# Patient Record
Sex: FEMALE | Race: WHITE
[De-identification: names, ages, dates, MRNs, and addresses within clinical notes are randomized per-mention and may not be internally consistent; named-entity substitution may affect disease eponyms.]

---

## 2019-11-03 ENCOUNTER — HOSPITAL ENCOUNTER (EMERGENCY)
Dept: HOSPITAL 57 - BURERS | Age: 84
Discharge: HOME | End: 2019-11-03
Payer: MEDICARE

## 2019-11-03 DIAGNOSIS — S22.32XA: Primary | ICD-10-CM

## 2019-11-03 DIAGNOSIS — Z79.82: ICD-10-CM

## 2019-11-03 DIAGNOSIS — K21.9: ICD-10-CM

## 2019-11-03 DIAGNOSIS — I10: ICD-10-CM

## 2019-11-03 DIAGNOSIS — M19.90: ICD-10-CM

## 2019-11-03 DIAGNOSIS — Z79.899: ICD-10-CM

## 2019-11-03 DIAGNOSIS — Z86.73: ICD-10-CM
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
## 2019-11-03 PROCEDURE — 93005 ELECTROCARDIOGRAM TRACING: CPT

## 2019-11-03 PROCEDURE — 94799 UNLISTED PULMONARY SVC/PX: CPT

## 2019-11-03 NOTE — RAD
LEFT RIBS WITH PA CHEST:

 

11/03/2019

 

FINDINGS:

The bones are osteoporotic which could easily mask subtle fractures in this patient.

 

There is a definite acute fracture of the left 5th rib posterolaterally. There is a second fracture a
 little more distal to it that may or may not be acute. There are some equivocal findings in the 6th,
 7th and 8th ribs that could be subtle fractures but these are not certain.

 

The lungs are clear. There is no pneumothorax or pleural effusion. Heart size is normal. Mediastinum 
shows no widening or shift. Trachea is midline.

 

IMPRESSION:

Definite acute fracture of left 5th rib. Possible subtle fractures of 6th, 7th and 8th ribs.

 

CODE T

 

POS: HOME

## 2019-11-09 ENCOUNTER — HOSPITAL ENCOUNTER (EMERGENCY)
Dept: HOSPITAL 57 - BURERS | Age: 84
Discharge: HOME | End: 2019-11-09
Payer: MEDICARE

## 2019-11-09 DIAGNOSIS — Z79.82: ICD-10-CM
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
## 2019-11-09 DIAGNOSIS — Z86.73: ICD-10-CM

## 2019-11-09 DIAGNOSIS — Z79.1: ICD-10-CM

## 2019-11-09 DIAGNOSIS — Z79.899: ICD-10-CM

## 2019-11-09 DIAGNOSIS — I10: ICD-10-CM

## 2019-11-09 DIAGNOSIS — S01.81XD: ICD-10-CM

## 2019-11-09 DIAGNOSIS — K59.00: Primary | ICD-10-CM

## 2019-11-09 DIAGNOSIS — M19.90: ICD-10-CM

## 2019-11-09 PROCEDURE — 99283 EMERGENCY DEPT VISIT LOW MDM: CPT

## 2019-12-16 ENCOUNTER — HOSPITAL ENCOUNTER (INPATIENT)
Dept: HOSPITAL 57 - BURERS | Age: 84
LOS: 3 days | Discharge: NURSING, REHAB OR LONG-TERM CARE | DRG: 641 | End: 2019-12-19
Attending: FAMILY MEDICINE | Admitting: FAMILY MEDICINE
Payer: MEDICARE

## 2019-12-16 DIAGNOSIS — Z90.710: ICD-10-CM

## 2019-12-16 DIAGNOSIS — I10: ICD-10-CM

## 2019-12-16 DIAGNOSIS — Z79.899: ICD-10-CM

## 2019-12-16 DIAGNOSIS — R11.2: ICD-10-CM
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
## 2019-12-16 DIAGNOSIS — Z96.642: ICD-10-CM

## 2019-12-16 DIAGNOSIS — S22.41XA: ICD-10-CM

## 2019-12-16 DIAGNOSIS — B96.20: ICD-10-CM

## 2019-12-16 DIAGNOSIS — N39.0: ICD-10-CM

## 2019-12-16 DIAGNOSIS — E86.0: Primary | ICD-10-CM

## 2019-12-16 DIAGNOSIS — R53.1: ICD-10-CM

## 2019-12-16 DIAGNOSIS — M19.91: ICD-10-CM

## 2019-12-16 DIAGNOSIS — Z79.82: ICD-10-CM

## 2019-12-16 DIAGNOSIS — K21.9: ICD-10-CM

## 2019-12-16 DIAGNOSIS — K59.00: ICD-10-CM

## 2019-12-16 DIAGNOSIS — E78.5: ICD-10-CM

## 2019-12-16 DIAGNOSIS — R53.81: ICD-10-CM

## 2019-12-16 LAB
ANION GAP SERPL CALC-SCNC: 15 MMOL/L (ref 10–20)
BACTERIA UR QL AUTO: (no result) HPF
BASOPHILS # BLD AUTO: 0.1 THOU/UL (ref 0–0.2)
BASOPHILS NFR BLD AUTO: 0.7 % (ref 0–1)
BUN SERPL-MCNC: 23 MG/DL (ref 9.8–20.1)
CALCIUM SERPL-MCNC: 9.5 MG/DL (ref 7.8–10.44)
CHLORIDE SERPL-SCNC: 106 MMOL/L (ref 98–107)
CO2 SERPL-SCNC: 26 MMOL/L (ref 23–31)
CREAT CL PREDICTED SERPL C-G-VRATE: 0 ML/MIN (ref 70–130)
EOSINOPHIL # BLD AUTO: 0.1 THOU/UL (ref 0–0.7)
EOSINOPHIL NFR BLD AUTO: 1.5 % (ref 0–10)
GLUCOSE SERPL-MCNC: 135 MG/DL (ref 83–110)
HGB BLD-MCNC: 13.1 G/DL (ref 12–16)
LYMPHOCYTES # BLD AUTO: 0.8 THOU/UL (ref 1.2–3.4)
LYMPHOCYTES NFR BLD AUTO: 10.2 % (ref 21–51)
MCH RBC QN AUTO: 28.4 PG (ref 27–31)
MCV RBC AUTO: 91 FL (ref 78–98)
MONOCYTES # BLD AUTO: 0.5 THOU/UL (ref 0.11–0.59)
MONOCYTES NFR BLD AUTO: 6.2 % (ref 0–10)
NEUTROPHILS # BLD AUTO: 6.3 THOU/UL (ref 1.4–6.5)
NEUTROPHILS NFR BLD AUTO: 81.4 % (ref 42–75)
PLATELET # BLD AUTO: 237 THOU/UL (ref 130–400)
POTASSIUM SERPL-SCNC: 3.7 MMOL/L (ref 3.5–5.1)
RBC # BLD AUTO: 4.61 MILL/UL (ref 4.2–5.4)
SODIUM SERPL-SCNC: 143 MMOL/L (ref 136–145)
WBC # BLD AUTO: 7.8 THOU/UL (ref 4.8–10.8)
WBC UR QL AUTO: (no result) HPF (ref 0–3)

## 2019-12-16 PROCEDURE — 87077 CULTURE AEROBIC IDENTIFY: CPT

## 2019-12-16 PROCEDURE — 85018 HEMOGLOBIN: CPT

## 2019-12-16 PROCEDURE — G0008 ADMIN INFLUENZA VIRUS VAC: HCPCS

## 2019-12-16 PROCEDURE — 85049 AUTOMATED PLATELET COUNT: CPT

## 2019-12-16 PROCEDURE — 82565 ASSAY OF CREATININE: CPT

## 2019-12-16 PROCEDURE — 85025 COMPLETE CBC W/AUTO DIFF WBC: CPT

## 2019-12-16 PROCEDURE — 87186 SC STD MICRODIL/AGAR DIL: CPT

## 2019-12-16 PROCEDURE — 90662 IIV NO PRSV INCREASED AG IM: CPT

## 2019-12-16 PROCEDURE — 87086 URINE CULTURE/COLONY COUNT: CPT

## 2019-12-16 PROCEDURE — 36415 COLL VENOUS BLD VENIPUNCTURE: CPT

## 2019-12-16 PROCEDURE — 93005 ELECTROCARDIOGRAM TRACING: CPT

## 2019-12-16 PROCEDURE — 90471 IMMUNIZATION ADMIN: CPT

## 2019-12-16 PROCEDURE — 80048 BASIC METABOLIC PNL TOTAL CA: CPT

## 2019-12-16 PROCEDURE — 80053 COMPREHEN METABOLIC PANEL: CPT

## 2019-12-16 PROCEDURE — 85014 HEMATOCRIT: CPT

## 2019-12-16 PROCEDURE — 81001 URINALYSIS AUTO W/SCOPE: CPT

## 2019-12-16 NOTE — RAD
XR Ribs Rt>= 2 View W/PA CXR



HISTORY: Fall with right rib pain, chest pain



FINDINGS:

There are fractures along the right fifth, sixth, seventh, eighth and ninth ribs.



Reported By: Peter Loo 

Electronically Signed:  12/16/2019 2:48 PM

## 2019-12-17 LAB
ALBUMIN SERPL BCG-MCNC: 3.2 G/DL (ref 3.4–4.8)
ALP SERPL-CCNC: 95 U/L (ref 40–110)
ALT SERPL W P-5'-P-CCNC: 23 U/L (ref 8–55)
ANION GAP SERPL CALC-SCNC: 13 MMOL/L (ref 10–20)
AST SERPL-CCNC: 25 U/L (ref 5–34)
BASOPHILS # BLD AUTO: 0.1 THOU/UL (ref 0–0.2)
BASOPHILS NFR BLD AUTO: 0.9 % (ref 0–1)
BILIRUB SERPL-MCNC: 0.6 MG/DL (ref 0.2–1.2)
BUN SERPL-MCNC: 16 MG/DL (ref 9.8–20.1)
CALCIUM SERPL-MCNC: 8.5 MG/DL (ref 7.8–10.44)
CHLORIDE SERPL-SCNC: 107 MMOL/L (ref 98–107)
CO2 SERPL-SCNC: 25 MMOL/L (ref 23–31)
CREAT CL PREDICTED SERPL C-G-VRATE: 51 ML/MIN (ref 70–130)
EOSINOPHIL # BLD AUTO: 0.1 THOU/UL (ref 0–0.7)
EOSINOPHIL NFR BLD AUTO: 1.1 % (ref 0–10)
GLOBULIN SER CALC-MCNC: 2.7 G/DL (ref 2.4–3.5)
GLUCOSE SERPL-MCNC: 101 MG/DL (ref 83–110)
HGB BLD-MCNC: 11.2 G/DL (ref 12–16)
LYMPHOCYTES # BLD AUTO: 0.8 THOU/UL (ref 1.2–3.4)
LYMPHOCYTES NFR BLD AUTO: 13.4 % (ref 21–51)
MCH RBC QN AUTO: 28.2 PG (ref 27–31)
MCV RBC AUTO: 90.4 FL (ref 78–98)
MONOCYTES # BLD AUTO: 0.6 THOU/UL (ref 0.11–0.59)
MONOCYTES NFR BLD AUTO: 10.1 % (ref 0–10)
NEUTROPHILS # BLD AUTO: 4.4 THOU/UL (ref 1.4–6.5)
NEUTROPHILS NFR BLD AUTO: 74.6 % (ref 42–75)
PLATELET # BLD AUTO: 200 THOU/UL (ref 130–400)
POTASSIUM SERPL-SCNC: 3.7 MMOL/L (ref 3.5–5.1)
RBC # BLD AUTO: 3.99 MILL/UL (ref 4.2–5.4)
SODIUM SERPL-SCNC: 141 MMOL/L (ref 136–145)
WBC # BLD AUTO: 5.9 THOU/UL (ref 4.8–10.8)

## 2019-12-17 RX ADMIN — ASPIRIN 81 MG CHEWABLE TABLET SCH MG: 81 TABLET CHEWABLE at 10:20

## 2019-12-17 RX ADMIN — SENNOSIDES PRN TAB: 8.6 TABLET, FILM COATED ORAL at 21:04

## 2019-12-17 NOTE — HP
CHIEF COMPLAINT:  Rib pain status post fall with dehydration and generalized

weakness. 



HISTORY OF PRESENT ILLNESS:  This is an 84-year-old female, who lives at home 
alone

and ambulates with the assistance of a walker, who fell at her home setting 
earlier

today prompting an evaluation at the [facility] Emergency Department.  
Subsequent

workup revealed the patient to have multiple right-sided rib fractures 
including the

5th, 6th, 7th, 8th and 9th ribs.  Her lab work is consistent with dehydration 
as her

BUN is 23 with a creatinine of 0.83.  She did receive some pain medication in 
the

emergency department, and upon re-evaluation, developed nausea and vomiting.  
The

patient denies having nausea or vomiting at her home setting.  The patient 
notably

has physical deconditioning and complains of feeling generalized weakness.

Secondary to the patient's deconditioned state, inability to care for herself,

multiple rib fractures with need for further pain management and dehydrated 
state,

she has been admitted for further care. 



PAST MEDICAL HISTORY:  Gastroesophageal reflux disease, hypertension, 
dyslipidemia,

and arthritis. 



PAST SURGICAL HISTORY:  She had an abdominal hysterectomy in .  She had a 
left

hip replacement in . 



FAMILY HISTORY:  Noncontributory.



SOCIAL HISTORY:  Nonsmoker with no ETOH or illicit drug use.



ALLERGIES:  TO LOPERAMIDE AND REPORTEDLY TRAMADOL.



CURRENT MEDICATIONS:  

1. Calcium 500 mg once a day.

2. Ibuprofen 400 mg q.12 hours.

3. Aspirin 81 mg daily.

4. Tylenol 500 mg q.6 hours.

5. Bisoprolol/hydrochlorothiazide 5/6.25 mg daily.

6. Amlodipine 5 mg daily.

7. Lovastatin 20 mg at bedtime.



REVIEW OF SYSTEMS:  GENERAL:  The patient complains of fatigue.  She denies 
fever. 

EARS, NOSE, AND THROAT:  Denies sore throat, nasal drainage or congestion. 

CARDIOVASCULAR:  Denies chest pain or palpitations. 

RESPIRATORY:  Denies shortness of breath or cough. 

GASTROINTESTINAL:  Denies abdominal pain.  She complains of nausea and vomiting.

Denies diarrhea or constipation. 

GENITOURINARY:  Denies dysuria. 

MUSCULOSKELETAL:  Complains of right-sided rib pain. 

DERMATOLOGIC:  Denies rash. 

NEUROLOGIC:  Denies headache.



LABORATORY DATA:  White blood cell count is 7.8, H and H are 13.1 and 41.9, and

platelets are 237.  Sodium is 143, potassium is 3.7, BUN is 23, creatinine is 
0.83,

GFR is 65, and glucose is 135. 



IMAGIN2019, rib x-ray shows fractures along the right 5th, 6th, 7th, 
8th,

and 9th ribs. 



PHYSICAL EXAMINATION:

VITAL SIGNS:  Blood pressure 183/85, oxygen 91% on room air, pulse 72, 
respiratory

rate 18, and temperature 97.7. 

GENERAL:  The patient is alert and oriented, in no acute distress. 

HEAD, EYES, EARS, NOSE, AND THROAT:  Conjunctivae are clear.  Extraocular 
muscles

are intact.  Dry mucous membranes.  She is edentulous.  Normocephalic and

atraumatic. 

NECK:  Supple.  No lymphadenopathy. 

CARDIOVASCULAR:  Regular rate and rhythm.  Normal S1 and S2. 

RESPIRATORY:  Clear to auscultation bilaterally without wheezes, rales or 
rhonchi. 

GASTROINTESTINAL:  Soft and nontender to palpation. 

EXTREMITIES:  She has a leg length discrepancy with the right leg shorter than 
the

left.  She has also arthritic changes to bilateral hands.  There is a 
peripheral IV

to the left upper extremity. 

SKIN:  No rash. 

NEUROLOGIC:  Nonfocal.  She has generalized weakness.



ASSESSMENT AND PLAN:  

1. Multiple right-sided rib fractures status post fall.  We will provide pain

medication with an attempt to avoid opioid secondary to the patient's 
intolerance. 

2. Dehydration.  We will continue the patient on normal saline at 75 mL an hour

overnight.  Recheck her labs in the morning. 

3. Nausea and vomiting.  The patient will be provided Zofran sublingual p.r.n.

4. Physical deconditioning.  Order Physical Therapy and Occupational Therapy.

5. Hypertension.  We will resume the patient's home blood pressure medications.

6. Dyslipidemia.  We will resume the patient's home statin medication.

7. Prophylaxis.  We will provide famotidine and Lovenox.



CODE STATUS:  Full.



DISPOSITION:  Question the patient's ability to satisfactorily care for herself 
at

her home setting.  We will monitor her progress and consider transition to a 
nursing

home. 







Job ID:  [id number]



[facility]

## 2019-12-18 RX ADMIN — SENNOSIDES PRN TAB: 8.6 TABLET, FILM COATED ORAL at 08:55

## 2019-12-18 RX ADMIN — ASPIRIN 81 MG CHEWABLE TABLET SCH MG: 81 TABLET CHEWABLE at 09:00

## 2019-12-19 ENCOUNTER — HOSPITAL ENCOUNTER (INPATIENT)
Dept: HOSPITAL 57 - BURMED | Age: 84
LOS: 12 days | Discharge: NURSING, REHAB OR LONG-TERM CARE | DRG: 561 | End: 2019-12-31
Attending: FAMILY MEDICINE | Admitting: FAMILY MEDICINE
Payer: MEDICARE

## 2019-12-19 VITALS — DIASTOLIC BLOOD PRESSURE: 61 MMHG | SYSTOLIC BLOOD PRESSURE: 118 MMHG | TEMPERATURE: 98.3 F

## 2019-12-19 VITALS — BODY MASS INDEX: 24.9 KG/M2

## 2019-12-19 DIAGNOSIS — I10: ICD-10-CM

## 2019-12-19 DIAGNOSIS — K21.9: ICD-10-CM

## 2019-12-19 DIAGNOSIS — Z79.82: ICD-10-CM

## 2019-12-19 DIAGNOSIS — S22.41XD: Primary | ICD-10-CM

## 2019-12-19 DIAGNOSIS — E78.5: ICD-10-CM
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
## 2019-12-19 DIAGNOSIS — Z79.899: ICD-10-CM

## 2019-12-19 DIAGNOSIS — M19.91: ICD-10-CM

## 2019-12-19 LAB
CREAT CL PREDICTED SERPL C-G-VRATE: 56 ML/MIN (ref 70–130)
HGB BLD-MCNC: 11.9 G/DL (ref 12–16)
PLATELET # BLD AUTO: 198 THOU/UL (ref 130–400)

## 2019-12-19 RX ADMIN — ASPIRIN 81 MG CHEWABLE TABLET SCH MG: 81 TABLET CHEWABLE at 08:59

## 2019-12-20 RX ADMIN — MAGNESIUM HYDROXIDE PRN ML: 400 SUSPENSION ORAL at 08:30

## 2019-12-20 RX ADMIN — ASPIRIN 81 MG CHEWABLE TABLET SCH MG: 81 TABLET CHEWABLE at 08:34

## 2019-12-21 RX ADMIN — ASPIRIN 81 MG CHEWABLE TABLET SCH MG: 81 TABLET CHEWABLE at 08:48

## 2019-12-21 NOTE — HP
DATE OF TRANSFER:  12/19/2019.



REASON FOR TRANSFER:  Debilitation and weakness status post rib fracture.



HISTORY OF PRESENT ILLNESS:  The patient is an 84-year-old white female, who lives

alone, usually ambulates with the assistance of a walker, who reports falling after

walking down the ramp of her house.  She was seen in the emergency room, workup

showed multiple right-sided rib fractures at 6th, 5th, 7th, 8th, and 9th.  She was

also found to be dehydrated with an elevated BUN and requiring significant pain

medications with IV pain medication requirement.  The patient was admitted to the

hospital, given IV pain medication, dehydration resolved with IV and oral intake,

and Physical Therapy and Occupational Therapy were consulted.  The patient was found

to be stable enough to be discharged from acute care status, but continued to need

skilled nursing with pain management as well as physical therapy and occupational

therapy.  It was completely unsafe for her to be discharged to home, and thus was

transferred to swing bed status on 12/19/2019. 



PAST MEDICAL HISTORY:  

1. Osteoarthritis.

2. Gastroesophageal reflux disease.

3. Hypertension.

4. Hyperlipidemia.



SOCIAL HISTORY:  The patient has no significant alcohol or social drug use.  She

lives alone for many years and has no children. 



MEDICATIONS:  Include:

1. Calcium 500 mg p.o. daily.

2. Ibuprofen 400 mg q.12 hours p.r.n. pain.

3. Aspirin 81 mg daily.

4. Bisoprolol/hydrochlorothiazide 5/6.25 p.o. daily.

5. Amlodipine 5 mg daily.

6. Lovastatin 20 mg p.o. at bedtime.

7. The patient has been on morphine as well as hydrocodone p.r.n. pain.



REVIEW OF SYSTEMS:  The patient does report continued pain in her right side with

deep inspiration or movement secondary to rib fractures.  No significant cough.  No

URI-like symptoms.  No dysuria or hematuria.  No change in urinary frequency.  No

shortness of breath other than secondary to pain.  The patient reports appetite is

fairly good.  The patient denies depression.  No significant back pain.  No recent

weight loss, no weight gain.  No rashes reported. 



PHYSICAL EXAMINATION:

GENERAL:  Elderly white female, alert and oriented x3, in no obvious distress. 

VITAL SIGNS:  Blood pressure is 138/68, respiratory rate is 16, pulse is 72. 

HEENT:  Atraumatic, normocephalic.  Extraocular movements are intact.  Pupils are

equal, round, and reactive to light and accommodation. 

NECK:  Supple.  No masses palpated. 

CHEST:  Clear to auscultation bilaterally.  Evaluation to the chest wall showed

tenderness in the right anterior and lateral rib areas. 

HEART:  __________ rate and rhythm. 

ABDOMEN:  Soft, nontender, nondistended.  No masses are palpated. 

EXTREMITIES:  Show no cyanosis, clubbing, or edema.



ASSESSMENT AND PLAN:  

1. Status post multiple rib fractures, the patient is debilitated, lives alone, will

need continued inpatient occupational and physical therapy under skilled nursing. 

2. Pain, we will continue pain control as needed.

3. Hypertension, presently controlled.

4. Osteoarthritis, stable.

5. Hyperlipidemia, continue lovastatin.

6. Gastroesophageal reflux disease, presently controlled.



DISPOSITION:  The patient will likely need another 10 to 14 days of skilled nursing.

 The plan is to discharge to home with home health when she is able to transfer and

has enough strength for her activities of daily living. 







Job ID:  [id number] normal...

## 2019-12-22 RX ADMIN — MAGNESIUM HYDROXIDE PRN ML: 400 SUSPENSION ORAL at 08:55

## 2019-12-22 RX ADMIN — ASPIRIN 81 MG CHEWABLE TABLET SCH MG: 81 TABLET CHEWABLE at 08:51

## 2019-12-23 RX ADMIN — ASPIRIN 81 MG CHEWABLE TABLET SCH MG: 81 TABLET CHEWABLE at 08:27

## 2019-12-24 RX ADMIN — ASPIRIN 81 MG CHEWABLE TABLET SCH MG: 81 TABLET CHEWABLE at 08:56

## 2019-12-25 RX ADMIN — ASPIRIN 81 MG CHEWABLE TABLET SCH MG: 81 TABLET CHEWABLE at 08:48

## 2019-12-26 RX ADMIN — ASPIRIN 81 MG CHEWABLE TABLET SCH MG: 81 TABLET CHEWABLE at 07:46

## 2019-12-27 RX ADMIN — ASPIRIN 81 MG CHEWABLE TABLET SCH MG: 81 TABLET CHEWABLE at 08:42

## 2019-12-28 RX ADMIN — ASPIRIN 81 MG CHEWABLE TABLET SCH MG: 81 TABLET CHEWABLE at 08:31

## 2019-12-29 RX ADMIN — ASPIRIN 81 MG CHEWABLE TABLET SCH MG: 81 TABLET CHEWABLE at 08:16

## 2019-12-30 RX ADMIN — ASPIRIN 81 MG CHEWABLE TABLET SCH MG: 81 TABLET CHEWABLE at 09:18

## 2019-12-31 VITALS — SYSTOLIC BLOOD PRESSURE: 163 MMHG | TEMPERATURE: 97.9 F | DIASTOLIC BLOOD PRESSURE: 74 MMHG

## 2019-12-31 RX ADMIN — ASPIRIN 81 MG CHEWABLE TABLET SCH MG: 81 TABLET CHEWABLE at 08:44

## 2020-01-02 NOTE — DIS
DATE OF ADMISSION:  12/19/2019



DATE OF DISCHARGE:  12/31/2019



ADMISSION DIAGNOSES:  Multiple right rib fractures, status post fall, 
dehydration,

nausea, vomiting, physical deconditioning. 



SECONDARY DIAGNOSES:  Hypertension, and dyslipidemia.



PROCEDURES:  12/16/2019, Ribs with chest x-ray showed there are fractures along 
the

right 5th, 6th, 7th, 8th, and 9th ribs. 



HOSPITAL COURSE:  An 84-year-old female presented to the [facility] Emergency

Department, status post fall at home.  The patient typically ambulates with the

assistance of a walker.  Workup revealed the patient to have multiple rib 
fractures

to the right side causing her intractable pain and a decreased functional 
status.

Further workup revealed the patient to be dehydrated as well that she was 
admitted

for IV hydration, pain control, and need for skilled care with physical therapy 
and

occupational therapy.  She is unable to care for herself at home where she lives

alone.  The patient was able to improve the functional status in regard to 
mobility

and transfers with the aid of physical therapy and occupational therapy.  She 
was

provided medications for pain control. Her dehydration quickly resolved early in

her stay with the assistance of IV fluids.  As stated, the patient typically 
lives

alone and secondary to having multiple falls in the past with a prior admission

secondary to pelvic fracture, she has been advised to transition to a nursing

facility; she is amenable to do this and has been set up to discharge to 
[facility] for further care. 



DISPOSITION:  The patient will be discharged to [facility].



DISCHARGE MEDICATIONS:  Include;

1. Senokot at bedtime p.r.n.

2. MiraLAX 17 g daily p.r.n.

3. Zofran 4 mg sublingual q.6 hours p.r.n.

4. Lovastatin 20 mg at bedtime.

5. Ibuprofen 400 mg b.i.d. p.r.n.

6. Famotidine 20 mg b.i.d.

7. Zyrtec 10 mg daily. 

8. Bisoprolol/hydrochlorothiazide 5/6.25 mg daily.

9. Amlodipine 10 mg daily.

10. Tylenol p.r.n.

11. Calcium carbonate 500 mg daily.

12. Aspirin 81 mg daily.







Job ID:  [id number]



[facility]

## 2020-02-02 ENCOUNTER — HOSPITAL ENCOUNTER (EMERGENCY)
Dept: HOSPITAL 57 - BURERS | Age: 85
Discharge: HOME | End: 2020-02-02
Payer: MEDICARE

## 2020-02-02 DIAGNOSIS — Z79.899: ICD-10-CM

## 2020-02-02 DIAGNOSIS — Z79.82: ICD-10-CM

## 2020-02-02 DIAGNOSIS — F41.9: ICD-10-CM

## 2020-02-02 DIAGNOSIS — K21.9: ICD-10-CM

## 2020-02-02 DIAGNOSIS — M19.90: ICD-10-CM

## 2020-02-02 DIAGNOSIS — I10: ICD-10-CM

## 2020-02-02 DIAGNOSIS — Z86.73: ICD-10-CM

## 2020-02-02 DIAGNOSIS — J90: Primary | ICD-10-CM

## 2020-02-02 LAB
ALBUMIN SERPL BCG-MCNC: 3.9 G/DL (ref 3.4–4.8)
ALP SERPL-CCNC: 180 U/L (ref 40–110)
ALT SERPL W P-5'-P-CCNC: 16 U/L (ref 8–55)
ANION GAP SERPL CALC-SCNC: 15 MMOL/L (ref 10–20)
AST SERPL-CCNC: 16 U/L (ref 5–34)
BASOPHILS # BLD AUTO: 0.1 THOU/UL (ref 0–0.2)
BASOPHILS NFR BLD AUTO: 1.5 % (ref 0–1)
BILIRUB SERPL-MCNC: (no result) MG/DL (ref 0.2–1.2)
BUN SERPL-MCNC: 14 MG/DL (ref 9.8–20.1)
CALCIUM SERPL-MCNC: 8.9 MG/DL (ref 7.8–10.44)
CHLORIDE SERPL-SCNC: 105 MMOL/L (ref 98–107)
CO2 SERPL-SCNC: 25 MMOL/L (ref 23–31)
CREAT CL PREDICTED SERPL C-G-VRATE: 0 ML/MIN (ref 70–130)
EOSINOPHIL # BLD AUTO: 0.2 THOU/UL (ref 0–0.7)
EOSINOPHIL NFR BLD AUTO: 3.5 % (ref 0–10)
GLOBULIN SER CALC-MCNC: 3.2 G/DL (ref 2.4–3.5)
GLUCOSE SERPL-MCNC: 126 MG/DL (ref 83–110)
HGB BLD-MCNC: 12.7 G/DL (ref 12–16)
LYMPHOCYTES # BLD AUTO: 0.6 THOU/UL (ref 1.2–3.4)
LYMPHOCYTES NFR BLD AUTO: 14 % (ref 21–51)
MCH RBC QN AUTO: 27.8 PG (ref 27–31)
MCV RBC AUTO: 87.8 FL (ref 78–98)
MONOCYTES # BLD AUTO: 0.5 THOU/UL (ref 0.11–0.59)
MONOCYTES NFR BLD AUTO: 10.5 % (ref 0–10)
NEUTROPHILS # BLD AUTO: 3.2 THOU/UL (ref 1.4–6.5)
NEUTROPHILS NFR BLD AUTO: 70.4 % (ref 42–75)
PLATELET # BLD AUTO: 238 THOU/UL (ref 130–400)
POTASSIUM SERPL-SCNC: 3.3 MMOL/L (ref 3.5–5.1)
RBC # BLD AUTO: 4.56 MILL/UL (ref 4.2–5.4)
SODIUM SERPL-SCNC: 142 MMOL/L (ref 136–145)
WBC # BLD AUTO: 4.6 THOU/UL (ref 4.8–10.8)

## 2020-02-02 PROCEDURE — 85025 COMPLETE CBC W/AUTO DIFF WBC: CPT

## 2020-02-02 PROCEDURE — 84484 ASSAY OF TROPONIN QUANT: CPT

## 2020-02-02 PROCEDURE — 80053 COMPREHEN METABOLIC PANEL: CPT

## 2020-02-02 PROCEDURE — 71045 X-RAY EXAM CHEST 1 VIEW: CPT

## 2020-02-02 PROCEDURE — 83880 ASSAY OF NATRIURETIC PEPTIDE: CPT

## 2020-02-02 PROCEDURE — 93005 ELECTROCARDIOGRAM TRACING: CPT

## 2020-02-02 PROCEDURE — 96374 THER/PROPH/DIAG INJ IV PUSH: CPT

## 2020-02-04 ENCOUNTER — HOSPITAL ENCOUNTER (OUTPATIENT)
Dept: HOSPITAL 92 - RAD | Age: 85
Discharge: HOME | End: 2020-02-04
Attending: INTERNAL MEDICINE
Payer: MEDICARE

## 2020-02-04 ENCOUNTER — HOSPITAL ENCOUNTER (OUTPATIENT)
Dept: HOSPITAL 92 - SDC | Age: 85
Discharge: HOME | End: 2020-02-04
Attending: INTERNAL MEDICINE
Payer: MEDICARE

## 2020-02-04 DIAGNOSIS — R91.8: ICD-10-CM

## 2020-02-04 DIAGNOSIS — J90: Primary | ICD-10-CM

## 2020-02-04 DIAGNOSIS — K21.9: ICD-10-CM

## 2020-02-04 DIAGNOSIS — E11.9: ICD-10-CM

## 2020-02-04 DIAGNOSIS — E78.00: ICD-10-CM

## 2020-02-04 DIAGNOSIS — I77.819: ICD-10-CM

## 2020-02-04 DIAGNOSIS — R06.00: Primary | ICD-10-CM

## 2020-02-04 DIAGNOSIS — Z79.899: ICD-10-CM

## 2020-02-04 DIAGNOSIS — I10: ICD-10-CM

## 2020-02-04 DIAGNOSIS — Z79.82: ICD-10-CM

## 2020-02-04 DIAGNOSIS — I70.0: ICD-10-CM

## 2020-02-04 DIAGNOSIS — J90: ICD-10-CM

## 2020-02-04 LAB
AMYLASE PLR-CCNC: 41 U/L
GLUCOSE PLR-MCNC: 107 MG/DL
LDH PLR L TO P-CCNC: 225 U/L
NONHEMATIC CELLS NFR FLD MANUAL: 63 %
PROT PLR-MCNC: 5 G/DL
RBC # FLD AUTO: (no result) /CUMM
TRIGL FLD-MCNC: 51 MG/DL
WBC # FLD AUTO: 2311 UL

## 2020-02-04 PROCEDURE — 87116 MYCOBACTERIA CULTURE: CPT

## 2020-02-04 PROCEDURE — 71045 X-RAY EXAM CHEST 1 VIEW: CPT

## 2020-02-04 PROCEDURE — 87206 SMEAR FLUORESCENT/ACID STAI: CPT

## 2020-02-04 PROCEDURE — 88112 CYTOPATH CELL ENHANCE TECH: CPT

## 2020-02-04 PROCEDURE — 82150 ASSAY OF AMYLASE: CPT

## 2020-02-04 PROCEDURE — 87205 SMEAR GRAM STAIN: CPT

## 2020-02-04 PROCEDURE — 71046 X-RAY EXAM CHEST 2 VIEWS: CPT

## 2020-02-04 PROCEDURE — 84157 ASSAY OF PROTEIN OTHER: CPT

## 2020-02-04 PROCEDURE — 89051 BODY FLUID CELL COUNT: CPT

## 2020-02-04 PROCEDURE — 82945 GLUCOSE OTHER FLUID: CPT

## 2020-02-04 PROCEDURE — 32554 ASPIRATE PLEURA W/O IMAGING: CPT

## 2020-02-04 PROCEDURE — 83615 LACTATE (LD) (LDH) ENZYME: CPT

## 2020-02-04 PROCEDURE — 87070 CULTURE OTHR SPECIMN AEROBIC: CPT

## 2020-02-04 PROCEDURE — 88305 TISSUE EXAM BY PATHOLOGIST: CPT

## 2020-02-04 PROCEDURE — 0BJQ3ZZ INSPECTION OF PLEURA, PERCUTANEOUS APPROACH: ICD-10-PCS | Performed by: INTERNAL MEDICINE

## 2020-02-04 PROCEDURE — 84478 ASSAY OF TRIGLYCERIDES: CPT

## 2020-02-04 PROCEDURE — 83986 ASSAY PH BODY FLUID NOS: CPT

## 2020-02-04 PROCEDURE — 85060 BLOOD SMEAR INTERPRETATION: CPT

## 2020-02-04 NOTE — RAD
CHEST 2 VIEWS:

 

HISTORY: 

Cough.

 

COMPARISON: 

2/2/2020.

 

FINDINGS: 

Small right pleural effusion.  Heart size is within normal limits.  Increased linear and interstitial
 markings bilaterally appear stable.  

 

IMPRESSION: 

1.  Small right pleural effusion showing little change from prior study.  Mild stable increased lauren
ngs bilaterally.

 

2.  Atherosclerosis of the aorta with ectasia.  No evidence for confluent pneumonia.

 

POS: [facility]

## 2020-02-04 NOTE — HP
HISTORY OF PRESENT ILLNESS:  The patient is an 85-year-old  female from

[facility], who was sent here for a cough for about 7 months'

duration.  She is a nonsmoker, not drinker, prior history of pneumonia, but no TB or

asthma.  She has sustained a fall in December, was hospitalized with multiple rib

fractures.  Now, she had an x-ray taken over the weekend in the ER, which showed

some right pleural effusion.  Cough is nonproductive.  No fever.  No chills. 



Rib fractures were 5th, 6th, 7th, 8th, 9th after a fall in December 2019.



HOME MEDICATIONS:  

1. MiraLAX 17.

2. Zofran.

3. Lovastatin 20.

4. Ibuprofen 400.

5. Pepcid 20.

6. Zyrtec 10.

7. Ziac 5/6.25 once a day.

8. Amlodipine 10.

9. Tylenol.

10. Calcium.

11. Aspirin.



PAST MEDICAL HISTORY:  Pertinent mainly for hypertension, high cholesterol.  No

diabetes. 



PAST SURGICAL HISTORY:  None recently.



ALLERGIES:  APPARENTLY NONE.



SOCIAL AND FAMILY HISTORY:  She has never been .  Lives alone.  Worked on the

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 



REVIEW OF SYSTEMS:  Otherwise 10-point negative.



PHYSICAL EXAMINATION:

VITAL SIGNS:  Sats were 94% on room air, pulse 80, blood pressure  __________ 

CHEST:  Decreased breath sounds, right lung. Left lung unremarkable. 

CARDIAC: Normal S1, S2.  No gallops. 

ABDOMEN:  Soft without masses. 



X-ray shows right pleural effusion.



IMPRESSION:  

1. Right pleural effusion, chronic cough, probably secondary to multiple rib

fractures. 

2. Mild hypertension.

3. Reflux.

4. Nonsmoker.

5. Advanced age.

6. Diagnostic therapeutic thoracentesis to be performed. Further recommendation as

above. 







Job ID:  [id number]

## 2020-02-04 NOTE — DIS
DATE OF ADMISSION:  02/04/2020



DATE OF DISCHARGE:  02/04/2020



She tolerated the thoracentesis well.  She will be discharged home on prednisone 10

mg a day for 10 days, doxycycline 100 mg twice a day for a week.  Chest x-ray post

thoracentesis is stable. 



Follow up with Dr. Brar.  Follow up with Dr. Hampton as needed.







Job ID:  [id number]

## 2020-02-04 NOTE — RAD
XR Chest 1 View Portable



HISTORY: Thoracentesis



COMPARISON: Earlier exam of 9:47 AM from same date



FINDINGS: There is been interval improvement in the right pleural effusion since the previous study. 
No pneumothorax is seen.



Reported By: Peter Loo 

Electronically Signed:  2/4/2020 12:48 PM

## 2020-02-05 NOTE — OP
DATE OF PROCEDURE:  02/04/2020



PROCEDURE PERFORMED:  Thoracentesis.



INDICATION:  Right pleural effusion, probably traumatic rib fractures.



DESCRIPTION OF PROCEDURE:  After informed consent, the right posterior thorax 
was

cleaned with chlorhexidine.  1% lidocaine infiltrated in the 9th intercostal 
space

in post scapula area and the pleural cavity entered, using a 22-gauge needle, 
pleural

space was entered in and bloody effusion removed about 20 mL.  Thereafter, 
using an

8-Syriac catheter, total of additional 400 mL was removed without difficulty.  
The

patient tolerated the procedure well.  Please note, results of the pleural 
effusionwill be

revealed to the patient, family, and Dr. Brar. 



The patient otherwise tolerated the procedure well.  As noted, pleural effusion 
sent

for appropriate studies including cytology and culture. 







Job ID:  [id number]



[facility]

## 2020-12-29 ENCOUNTER — HOSPITAL ENCOUNTER (EMERGENCY)
Dept: HOSPITAL 57 - BURERS | Age: 85
End: 2020-12-29
Payer: MEDICARE

## 2020-12-29 DIAGNOSIS — Z79.82: ICD-10-CM

## 2020-12-29 DIAGNOSIS — I10: ICD-10-CM
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
## 2020-12-29 DIAGNOSIS — Z79.899: ICD-10-CM

## 2020-12-29 DIAGNOSIS — K21.9: ICD-10-CM

## 2020-12-29 DIAGNOSIS — S70.02XA: Primary | ICD-10-CM

## 2020-12-29 DIAGNOSIS — M19.90: ICD-10-CM

## 2020-12-29 DIAGNOSIS — Z86.73: ICD-10-CM

## 2020-12-29 NOTE — RAD
LEFT HIP TWO VIEWS: 

12/29/20

 

Comparison is made with the 6/4/18 study. 

 

The left hip arthroplasty remains in place with no signs of loosening or infection around it. No acut
e fracture was seen. I do not see any gross fracture of the pubic rings, though CT would be more sens
itive to such. The left SI joint was unremarkable as was the symphysis.

 

IMPRESSION: 

No definite acute findings. The patient's osteoporosis could mask subtle injuries, so if she does not
 improve over time, a CT might ultimately be needed. 

 

POS: HOME

## 2024-09-24 ENCOUNTER — HOSPITAL ENCOUNTER (EMERGENCY)
Dept: HOSPITAL 92 - ERS | Age: 89
Discharge: HOME | End: 2024-09-24
Payer: MEDICARE

## 2024-09-24 DIAGNOSIS — N39.0: ICD-10-CM

## 2024-09-24 DIAGNOSIS — I10: ICD-10-CM

## 2024-09-24 DIAGNOSIS — R19.7: Primary | ICD-10-CM

## 2024-09-24 DIAGNOSIS — E78.5: ICD-10-CM

## 2024-09-24 LAB
ALBUMIN SERPL BCG-MCNC: 3.9 G/DL (ref 3.4–4.8)
ALP SERPL-CCNC: 113 U/L (ref 40–110)
ALT SERPL W P-5'-P-CCNC: 41 U/L (ref 8–55)
ANION GAP SERPL CALC-SCNC: 15 MMOL/L (ref 10–20)
AST SERPL-CCNC: 56 U/L (ref 5–34)
BACTERIA UR QL AUTO: (no result) HPF
BASOPHILS # BLD AUTO: 0.03 10X3/UL (ref 0–0.2)
BASOPHILS NFR BLD AUTO: 0.7 % (ref 0–1)
BILIRUB SERPL-MCNC: 0.3 MG/DL (ref 0.2–1.2)
BUN SERPL-MCNC: 20 MG/DL (ref 9.8–20.1)
CALCIUM SERPL-MCNC: 9.2 MG/DL (ref 7.8–10.44)
CAUTI INDICATIONS FOR CULTURE: (no result)
CHLORIDE SERPL-SCNC: 113 MMOL/L (ref 98–107)
CO2 SERPL-SCNC: 19 MMOL/L (ref 23–31)
CREAT CL PREDICTED SERPL C-G-VRATE: 0 ML/MIN (ref 70–130)
EOSINOPHIL # BLD AUTO: 0.1 10X3/UL (ref 0–0.7)
EOSINOPHIL NFR BLD AUTO: 1.9 % (ref 0–10)
GLOBULIN SER CALC-MCNC: 3.7 G/DL (ref 2.4–3.5)
GLUCOSE SERPL-MCNC: 110 MG/DL (ref 83–110)
GLUCOSE UR STRIP-MCNC: 30 MG/DL
HCT VFR BLD CALC: 40.9 % (ref 36–47)
HGB BLD-MCNC: 13.2 G/DL (ref 12–16)
LEUKOCYTE ESTERASE UR QL STRIP.AUTO: 250 LEU/UL
LIPASE SERPL-CCNC: 26 U/L (ref 8–78)
LYMPHOCYTES NFR BLD AUTO: 16.7 % (ref 21–51)
MCH RBC QN AUTO: 27.8 PG (ref 27–31)
MCV RBC AUTO: 86.1 FL (ref 78–98)
MONOCYTES # BLD AUTO: 0.6 10X3/UL (ref 0.11–0.59)
MONOCYTES NFR BLD AUTO: 14.5 % (ref 0–10)
NEUTROPHILS # BLD AUTO: 2.7 10X3/UL (ref 1.4–6.5)
NEUTROPHILS NFR BLD AUTO: 65.5 % (ref 42–75)
PLATELET # BLD AUTO: 209 10X3/UL (ref 130–400)
POTASSIUM SERPL-SCNC: 3.7 MMOL/L (ref 3.5–5.1)
PROT UR STRIP.AUTO-MCNC: 20 MG/DL
RBC # BLD AUTO: 4.75 MILL/UL (ref 4.2–5.4)
RBC UR QL AUTO: (no result) HPF (ref 0–3)
SODIUM SERPL-SCNC: 143 MMOL/L (ref 136–145)
SP GR UR STRIP: 1.03 (ref 1–1.04)
WBC # BLD AUTO: 4.1 10X3/UL (ref 4.8–10.8)
WBC UR QL AUTO: (no result) HPF (ref 0–3)

## 2024-09-24 PROCEDURE — 87505 NFCT AGENT DETECTION GI: CPT

## 2024-09-24 PROCEDURE — 80053 COMPREHEN METABOLIC PANEL: CPT

## 2024-09-24 PROCEDURE — 74177 CT ABD & PELVIS W/CONTRAST: CPT

## 2024-09-24 PROCEDURE — 87328 CRYPTOSPORIDIUM AG IA: CPT

## 2024-09-24 PROCEDURE — 85025 COMPLETE CBC W/AUTO DIFF WBC: CPT

## 2024-09-24 PROCEDURE — 87449 NOS EACH ORGANISM AG IA: CPT

## 2024-09-24 PROCEDURE — 99284 EMERGENCY DEPT VISIT MOD MDM: CPT

## 2024-09-24 PROCEDURE — 87329 GIARDIA AG IA: CPT

## 2024-09-24 PROCEDURE — 82274 ASSAY TEST FOR BLOOD FECAL: CPT

## 2024-09-24 PROCEDURE — 81001 URINALYSIS AUTO W/SCOPE: CPT

## 2024-09-24 PROCEDURE — 83690 ASSAY OF LIPASE: CPT

## 2024-09-24 PROCEDURE — 87324 CLOSTRIDIUM AG IA: CPT
